# Patient Record
Sex: MALE | URBAN - METROPOLITAN AREA
[De-identification: names, ages, dates, MRNs, and addresses within clinical notes are randomized per-mention and may not be internally consistent; named-entity substitution may affect disease eponyms.]

---

## 2021-05-08 ENCOUNTER — TELEPHONE (OUTPATIENT)
Dept: FAMILY MEDICINE CLINIC | Facility: CLINIC | Age: 46
End: 2021-05-08

## 2021-05-08 NOTE — TELEPHONE ENCOUNTER
Pt's wife stated pt have not seen Dr Frank [place] yet but he will be the PCP, cut right middle finger on mower blade, going to ER, advised f/u to establish care , wife verbalized understanding

## 2021-05-10 ENCOUNTER — TELEPHONE (OUTPATIENT)
Dept: CASE MANAGEMENT | Age: 46
End: 2021-05-10

## 2021-05-10 NOTE — TELEPHONE ENCOUNTER
Called pt LM in regards to message, notified information will be updated in chart medical HX, surgies, allegies, Family HX, medications, and hospital follow up, will need to bring medications to appt to update correct information in chart

## 2021-05-10 NOTE — TELEPHONE ENCOUNTER
Pt wife left message regarding pt being at the hospital. Pt wife wanting clarification on process since pt has not been seen in office.  Please advise